# Patient Record
Sex: MALE | Race: WHITE | Employment: FULL TIME | URBAN - METROPOLITAN AREA
[De-identification: names, ages, dates, MRNs, and addresses within clinical notes are randomized per-mention and may not be internally consistent; named-entity substitution may affect disease eponyms.]

---

## 2019-07-21 ENCOUNTER — HOSPITAL ENCOUNTER (EMERGENCY)
Age: 29
Discharge: HOME | End: 2019-07-22
Attending: INTERNAL MEDICINE
Payer: COMMERCIAL

## 2019-07-21 DIAGNOSIS — R80.9 PROTEINURIA, UNSPECIFIED TYPE: ICD-10-CM

## 2019-07-21 DIAGNOSIS — M25.561 ACUTE PAIN OF BOTH KNEES: ICD-10-CM

## 2019-07-21 DIAGNOSIS — M25.562 ACUTE PAIN OF BOTH KNEES: ICD-10-CM

## 2019-07-21 DIAGNOSIS — E86.0 DEHYDRATION: ICD-10-CM

## 2019-07-21 DIAGNOSIS — R73.9 HYPERGLYCEMIA: Primary | ICD-10-CM

## 2019-07-21 PROCEDURE — 99283 EMERGENCY DEPT VISIT LOW MDM: CPT

## 2019-07-22 VITALS
OXYGEN SATURATION: 98 % | HEART RATE: 89 BPM | SYSTOLIC BLOOD PRESSURE: 114 MMHG | BODY MASS INDEX: 33.8 KG/M2 | TEMPERATURE: 98.2 F | RESPIRATION RATE: 20 BRPM | DIASTOLIC BLOOD PRESSURE: 52 MMHG | WEIGHT: 255 LBS | HEIGHT: 73 IN

## 2019-07-22 LAB
ALBUMIN SERPL-MCNC: 4.4 G/DL (ref 3.5–4.6)
ALP BLD-CCNC: 88 U/L (ref 35–104)
ALT SERPL-CCNC: 30 U/L (ref 0–41)
ANION GAP SERPL CALCULATED.3IONS-SCNC: 15 MEQ/L (ref 9–15)
AST SERPL-CCNC: 30 U/L (ref 0–40)
BACTERIA: NORMAL /HPF
BASOPHILS ABSOLUTE: 0 K/UL (ref 0–0.1)
BASOPHILS RELATIVE PERCENT: 0.4 % (ref 0.2–1.2)
BILIRUB SERPL-MCNC: 0.6 MG/DL (ref 0.2–0.7)
BILIRUBIN URINE: ABNORMAL
BLOOD, URINE: NEGATIVE
BUN BLDV-MCNC: 12 MG/DL (ref 6–20)
C-REACTIVE PROTEIN: 83.8 MG/L (ref 0–5)
CALCIUM SERPL-MCNC: 9.5 MG/DL (ref 8.5–9.9)
CHLORIDE BLD-SCNC: 103 MEQ/L (ref 95–107)
CLARITY: CLEAR
CO2: 23 MEQ/L (ref 20–31)
COLOR: YELLOW
CREAT SERPL-MCNC: 0.72 MG/DL (ref 0.7–1.2)
EOSINOPHILS ABSOLUTE: 0.2 K/UL (ref 0–0.5)
EOSINOPHILS RELATIVE PERCENT: 2.9 % (ref 0.8–7)
EPITHELIAL CELLS, UA: NORMAL /HPF
GFR AFRICAN AMERICAN: >60
GFR NON-AFRICAN AMERICAN: >60
GLOBULIN: 3.2 G/DL (ref 2.3–3.5)
GLUCOSE BLD-MCNC: 142 MG/DL (ref 70–99)
GLUCOSE URINE: NEGATIVE MG/DL
HBA1C MFR BLD: 5.2 % (ref 4.8–5.9)
HCT VFR BLD CALC: 41 % (ref 42–52)
HEMOGLOBIN: 13.7 G/DL (ref 14–18)
KETONES, URINE: ABNORMAL MG/DL
LACTIC ACID: 1.9 MMOL/L (ref 0.5–2.2)
LEUKOCYTE ESTERASE, URINE: NEGATIVE
LYMPHOCYTES ABSOLUTE: 0.8 K/UL (ref 1.3–3.6)
LYMPHOCYTES RELATIVE PERCENT: 11.7 %
MCH RBC QN AUTO: 27.7 PG (ref 27–31.3)
MCHC RBC AUTO-ENTMCNC: 33.4 % (ref 32.3–36.5)
MCV RBC AUTO: 83 FL (ref 80–100)
MONOCYTES ABSOLUTE: 0.9 K/UL (ref 0.3–0.8)
MONOCYTES RELATIVE PERCENT: 12.9 % (ref 5.3–12.2)
MUCUS: PRESENT
NEUTROPHILS ABSOLUTE: 5.1 K/UL (ref 1.8–5.4)
NEUTROPHILS RELATIVE PERCENT: 72.1 % (ref 34–67.9)
NITRITE, URINE: NEGATIVE
PDW BLD-RTO: 13.8 % (ref 11.6–14.4)
PH UA: 6 (ref 5–9)
PLATELET # BLD: 197 K/UL (ref 163–337)
POTASSIUM SERPL-SCNC: 3.5 MEQ/L (ref 3.4–4.9)
PROTEIN UA: 30 MG/DL
RBC # BLD: 4.94 M/UL (ref 4.7–6.1)
RBC UA: NORMAL /HPF (ref 0–2)
SEDIMENTATION RATE, ERYTHROCYTE: 10 MM (ref 0–10)
SODIUM BLD-SCNC: 141 MEQ/L (ref 135–144)
SPECIFIC GRAVITY UA: 1.02 (ref 1–1.03)
TOTAL CK: 56 U/L (ref 0–190)
TOTAL PROTEIN: 7.6 G/DL (ref 6.3–8)
URINE REFLEX TO CULTURE: YES
UROBILINOGEN, URINE: 1 E.U./DL
WBC # BLD: 7.1 K/UL (ref 4.2–9)
WBC UA: NORMAL /HPF (ref 0–5)

## 2019-07-22 PROCEDURE — 85025 COMPLETE CBC W/AUTO DIFF WBC: CPT

## 2019-07-22 PROCEDURE — 86140 C-REACTIVE PROTEIN: CPT

## 2019-07-22 PROCEDURE — 82550 ASSAY OF CK (CPK): CPT

## 2019-07-22 PROCEDURE — 2580000003 HC RX 258: Performed by: INTERNAL MEDICINE

## 2019-07-22 PROCEDURE — 80053 COMPREHEN METABOLIC PANEL: CPT

## 2019-07-22 PROCEDURE — 87086 URINE CULTURE/COLONY COUNT: CPT

## 2019-07-22 PROCEDURE — 85652 RBC SED RATE AUTOMATED: CPT

## 2019-07-22 PROCEDURE — 96360 HYDRATION IV INFUSION INIT: CPT

## 2019-07-22 PROCEDURE — 6370000000 HC RX 637 (ALT 250 FOR IP): Performed by: INTERNAL MEDICINE

## 2019-07-22 PROCEDURE — 83036 HEMOGLOBIN GLYCOSYLATED A1C: CPT

## 2019-07-22 PROCEDURE — 96361 HYDRATE IV INFUSION ADD-ON: CPT

## 2019-07-22 PROCEDURE — 81001 URINALYSIS AUTO W/SCOPE: CPT

## 2019-07-22 PROCEDURE — 83605 ASSAY OF LACTIC ACID: CPT

## 2019-07-22 RX ORDER — ACETAMINOPHEN 500 MG
1000 TABLET ORAL ONCE
Status: COMPLETED | OUTPATIENT
Start: 2019-07-22 | End: 2019-07-22

## 2019-07-22 RX ORDER — 0.9 % SODIUM CHLORIDE 0.9 %
1000 INTRAVENOUS SOLUTION INTRAVENOUS ONCE
Status: COMPLETED | OUTPATIENT
Start: 2019-07-22 | End: 2019-07-22

## 2019-07-22 RX ORDER — SODIUM CHLORIDE 0.9 % (FLUSH) 0.9 %
3 SYRINGE (ML) INJECTION EVERY 8 HOURS
Status: DISCONTINUED | OUTPATIENT
Start: 2019-07-22 | End: 2019-07-22 | Stop reason: HOSPADM

## 2019-07-22 RX ADMIN — SODIUM CHLORIDE 1000 ML: 9 INJECTION, SOLUTION INTRAVENOUS at 02:04

## 2019-07-22 RX ADMIN — ACETAMINOPHEN 1000 MG: 500 TABLET, FILM COATED ORAL at 01:03

## 2019-07-22 RX ADMIN — Medication 3 ML: at 01:04

## 2019-07-22 RX ADMIN — SODIUM CHLORIDE 1000 ML: 9 INJECTION, SOLUTION INTRAVENOUS at 01:03

## 2019-07-22 ASSESSMENT — PAIN DESCRIPTION - DESCRIPTORS
DESCRIPTORS: ACHING
DESCRIPTORS: ACHING

## 2019-07-22 ASSESSMENT — PAIN DESCRIPTION - FREQUENCY
FREQUENCY: CONTINUOUS
FREQUENCY: INTERMITTENT

## 2019-07-22 ASSESSMENT — PAIN - FUNCTIONAL ASSESSMENT: PAIN_FUNCTIONAL_ASSESSMENT: ACTIVITIES ARE NOT PREVENTED

## 2019-07-22 ASSESSMENT — PAIN DESCRIPTION - PROGRESSION: CLINICAL_PROGRESSION: NOT CHANGED

## 2019-07-22 ASSESSMENT — PAIN SCALES - GENERAL
PAINLEVEL_OUTOF10: 6
PAINLEVEL_OUTOF10: 7

## 2019-07-22 ASSESSMENT — PAIN DESCRIPTION - PAIN TYPE
TYPE: ACUTE PAIN
TYPE: ACUTE PAIN

## 2019-07-22 ASSESSMENT — PAIN DESCRIPTION - LOCATION
LOCATION: KNEE
LOCATION: KNEE

## 2019-07-22 ASSESSMENT — PAIN DESCRIPTION - ONSET
ONSET: ON-GOING
ONSET: ON-GOING

## 2019-07-22 NOTE — ED PROVIDER NOTES
[address] [facility] ED  EMERGENCY DEPARTMENT ENCOUNTER      Pt Name: Emi Chau  MRN: [id number]  [place] 1990  Date of evaluation: 7/21/2019  Provider: Анна Rodriguez MD    [address] [place] [address]       Chief Complaint   Patient presents with    Knee Pain         HISTORY OF PRESENT ILLNESS   (Location/Symptom, Timing/Onset, Context/Setting, Quality, Duration, Modifying Factors, Severity)  Note limiting factors. Emi Chau is a 34 y.o. male who presents to the emergency department for evaluation and management of 2 days of bilateral knee pain and now bilateral shin pain. Is accompanied by fever to 100 and fatigue. He has not done anything for his symptoms. He has not been seen by any other providers for. He denies trauma, excessive exertion. He has just started to wean himself from a keto diet and feels that this might be contributing to his malaise. He has lost weight on the diet. He is fearful that he might be diabetic, based on these symptoms. HPI    Nursing Notes were reviewed. REVIEW OF SYSTEMS    (2-9 systems for level 4, 10 or more for level 5)       REVIEW OF SYSTEMS    Constitutional: Positive for fever, Negative for fatigue. Respiratory: Negative for cough, chest tightness and shortness of breath. Cardiovascular: Negative for chest pain, palpitations and leg swelling. Gastrointestinal: Negative for abdominal distention, abdominal pain, diarrhea, nausea and vomiting. Musculoskeletal: Positive for bilateral knee and calf pain, negative for other arthralgias, back pain and neck pain. Skin: Negative for color change, pallor, rash and wound. Allergic/Immunologic: Negative for environmental allergiesand food allergies. Neurological: Negative for dizziness, speech difficulty, weakness, numbness and headaches. Hematological: Negative for adenopathy. Does not bruise/bleed easily. All other systems reviewed and are negative.     Except as noted above theremainder of the review of systems was reviewed and negative. PASTMEDICAL HISTORY   History reviewed. No pertinent past medical history. SURGICAL HISTORY     History reviewed. No pertinent surgical history. CURRENT MEDICATIONS       There are no discharge medications for this patient. ALLERGIES     Patient has no known allergies. FAMILY HISTORY     History reviewed. No pertinent family history.        SOCIAL HISTORY       Social History     Socioeconomic History    Marital status:      Spouse name: None    Number of children: None    Years of education: None    Highest education level: None   Occupational History    None   Social Needs    Financial resource strain: None    Food insecurity:     Worry: None     Inability: None    Transportation needs:     Medical: None     Non-medical: None   Tobacco Use    Smoking status: Former Smoker     Types: Cigarettes    Smokeless tobacco: Former User   Substance and Sexual Activity    Alcohol use: Not Currently    Drug use: Never    Sexual activity: Yes   Lifestyle    Physical activity:     Days per week: None     Minutes per session: None    Stress: None   Relationships    Social connections:     Talks on phone: None     Gets together: None     Attends Taoist service: None     Active member of club or organization: None     Attends meetings of clubs or organizations: None     Relationship status: None    Intimate partner violence:     Fear of current or ex partner: None     Emotionally abused: None     Physically abused: None     Forced sexual activity: None   Other Topics Concern    None   Social History Narrative    None       SCREENINGS             PHYSICAL EXAM    (up to 7 for level 4, 8 or more for level 5)     ED Triage Vitals [07/22/19 0001]   BP Temp Temp src Pulse Resp SpO2 Height Weight   129/77 100.3 °F (37.9 °C) -- 126 18 -- 6' 1\" (1.854 m) 255 lb (115.7 kg)       Physical Exam  Physical Exam   Constitutional:  Appears well, well-developed and well-nourished. No distress noted. Non toxic in appearance. HENT:     Head: Normocephalic and atraumatic. Mouth/Throat: Oropharynx is clear and mucosa moist. No oropharyngeal exudate noted. Posterior pharynx is pink and noninjected. Eyes: Conjunctivae and EOM are normal. Pupils are equal,round, and reactive to light. No scleral icterus. Neck: Normal range of motion. Neck supple. No tracheal deviation present. Cardiovascular: Normal rate, regular rhythm, normal heartsounds and intact distal pulses. Exam reveals no gallop or friction rub. No murmur heard. Pulmonary/Chest: Effort normal and breath sounds are symmetric and normal. No respiratory distress. There are no wheezes, rales or rhonchi. No tenderness is exhibited upon palpation of the chest wall. Abdominal: Soft. Bowel sounds are normal. No distension or no mass exhibitted. There is no tenderness, rebound, rigidity or guarding. Genitourinary:   No CVA tenderness noted on examination. Musculoskeletal: Normal range of motion of knees. No edema, warmth, erythema, palpable or reproducible tenderness or deformity. No palpable tenderness, erythema, warmth, swelling of calves or thights  Lymphadenopathy:  No cervical adenopathy. Neurological:   alert and oriented to person, place, and time. Normal speech, normal comprehension, normal cognition,  Reflexes are normal.  There are no cranial nerve deficits. Normal muscle tone, motor and sensory function exhibitted. Normal motor tone . Coordination and gait normal.  Normal finger to nose testing. Skin: Skin is warm and dry. No rash noted. No diaphoresis. No erythema. No pallor. Psychiatric: Pleasant and cooperative. normal mood and affect.  Behavior is  normal. Judgment and thought content normal.     DIAGNOSTIC RESULTS     EKG: All EKG's are interpreted by the Emergency Department Physician who either signs or Co-signs this chart in the absence of a cardiologist.    Not

## 2019-07-24 ENCOUNTER — HOSPITAL ENCOUNTER (OUTPATIENT)
Dept: LAB | Age: 29
Discharge: HOME | End: 2019-07-24
Payer: COMMERCIAL

## 2019-07-24 LAB
ALBUMIN SERPL-MCNC: 4.3 G/DL (ref 3.5–4.6)
ALP BLD-CCNC: 122 U/L (ref 35–104)
ALT SERPL-CCNC: 47 U/L (ref 0–41)
ANION GAP SERPL CALCULATED.3IONS-SCNC: 17 MEQ/L (ref 9–15)
AST SERPL-CCNC: 39 U/L (ref 0–40)
BILIRUB SERPL-MCNC: 1 MG/DL (ref 0.2–0.7)
BUN BLDV-MCNC: 12 MG/DL (ref 6–20)
C-REACTIVE PROTEIN: 186.6 MG/L (ref 0–5)
CALCIUM SERPL-MCNC: 9.5 MG/DL (ref 8.5–9.9)
CHLORIDE BLD-SCNC: 102 MEQ/L (ref 95–107)
CO2: 21 MEQ/L (ref 20–31)
CREAT SERPL-MCNC: 0.82 MG/DL (ref 0.7–1.2)
GFR AFRICAN AMERICAN: >60
GFR NON-AFRICAN AMERICAN: >60
GLOBULIN: 3.3 G/DL (ref 2.3–3.5)
GLUCOSE BLD-MCNC: 104 MG/DL (ref 70–99)
LACTIC ACID: 1.4 MMOL/L (ref 0.5–2.2)
POTASSIUM SERPL-SCNC: 4.2 MEQ/L (ref 3.4–4.9)
SODIUM BLD-SCNC: 140 MEQ/L (ref 135–144)
TOTAL PROTEIN: 7.6 G/DL (ref 6.3–8)
URINE CULTURE, ROUTINE: NORMAL

## 2019-07-24 PROCEDURE — 86140 C-REACTIVE PROTEIN: CPT

## 2019-07-24 PROCEDURE — 83605 ASSAY OF LACTIC ACID: CPT

## 2019-07-24 PROCEDURE — 80053 COMPREHEN METABOLIC PANEL: CPT

## 2019-07-24 PROCEDURE — 36415 COLL VENOUS BLD VENIPUNCTURE: CPT
